# Patient Record
Sex: MALE | Race: WHITE | Employment: OTHER
[De-identification: names, ages, dates, MRNs, and addresses within clinical notes are randomized per-mention and may not be internally consistent; named-entity substitution may affect disease eponyms.]

---

## 2024-01-17 ENCOUNTER — TELEPHONE (OUTPATIENT)
Dept: INTERNAL MEDICINE CLINIC | Facility: CLINIC | Age: 79
End: 2024-01-17

## 2024-01-17 NOTE — TELEPHONE ENCOUNTER
He is going to be a new patient of Jaimee, the first visit is hospital follow up focused after this appointment will be an \[facility]\"" care

## 2024-01-17 NOTE — TELEPHONE ENCOUNTER
D/C DATE: 1/13/24 - 1/16/24     D/C FROM: [facility]    D/C TO: home    PHONE NUMBER TO REACH PATIENT: [phone]    F/U APPT DATE & TIME: 1/25 at 845     Dx: urinary incontinence